# Patient Record
Sex: FEMALE | Race: WHITE | Employment: FULL TIME | URBAN - METROPOLITAN AREA
[De-identification: names, ages, dates, MRNs, and addresses within clinical notes are randomized per-mention and may not be internally consistent; named-entity substitution may affect disease eponyms.]

---

## 2021-01-12 ENCOUNTER — RECORDS - HEALTHEAST (OUTPATIENT)
Dept: LAB | Facility: CLINIC | Age: 31
End: 2021-01-12

## 2021-01-13 LAB — BACTERIA SPEC CULT: NO GROWTH

## 2021-04-30 ENCOUNTER — RECORDS - HEALTHEAST (OUTPATIENT)
Dept: LAB | Facility: CLINIC | Age: 31
End: 2021-04-30

## 2021-04-30 LAB
FASTING STATUS PATIENT QL REPORTED: ABNORMAL
GLUCOSE 1H P 50 G GLC PO SERPL-MCNC: 161 MG/DL (ref 70–139)
HGB BLD-MCNC: 12.7 G/DL (ref 12–16)

## 2021-07-19 PROCEDURE — 87210 SMEAR WET MOUNT SALINE/INK: CPT | Mod: ORL | Performed by: ADVANCED PRACTICE MIDWIFE

## 2021-07-19 PROCEDURE — 87086 URINE CULTURE/COLONY COUNT: CPT | Mod: ORL | Performed by: ADVANCED PRACTICE MIDWIFE

## 2021-07-20 ENCOUNTER — LAB REQUISITION (OUTPATIENT)
Dept: LAB | Facility: CLINIC | Age: 31
End: 2021-07-20
Payer: MEDICAID

## 2021-07-20 DIAGNOSIS — N89.9 NONINFLAMMATORY DISORDER OF VAGINA, UNSPECIFIED: ICD-10-CM

## 2021-07-20 DIAGNOSIS — R30.0 DYSURIA: ICD-10-CM

## 2021-07-20 LAB
CLUE CELLS: ABNORMAL
TRICHOMONAS, WET PREP: ABNORMAL
WBC'S/HIGH POWER FIELD, WET PREP: ABNORMAL
YEAST, WET PREP: ABNORMAL

## 2021-07-21 LAB — BACTERIA UR CULT: ABNORMAL

## 2021-08-23 ENCOUNTER — MEDICAL CORRESPONDENCE (OUTPATIENT)
Dept: HEALTH INFORMATION MANAGEMENT | Facility: CLINIC | Age: 31
End: 2021-08-23

## 2021-09-10 NOTE — PROGRESS NOTES
SUBJECTIVE:   CC: Caroline Braxton is an 30 year old woman who presents for preventive health visit.       Patient has been advised of split billing requirements and indicates understanding: Yes  Healthy Habits:     Getting at least 3 servings of Calcium per day:  Yes    Bi-annual eye exam:  NO    Dental care twice a year:  NO    Sleep apnea or symptoms of sleep apnea:  None    Diet:  Other    Frequency of exercise:  None    Taking medications regularly:  No    Medication side effects:  Not applicable    PHQ-2 Total Score: 0    Additional concerns today:  Yes (rash in groin area, csection scar )    - Rash, very itchy, seems to be spreading   - Tried all kinds of different creams - antibiotic, hydrocortisone, 2x jock itch spray, nothing really helped   - Patient is 3.5 months post partum       Today's PHQ-2 Score:   PHQ-2 ( 1999 [employer]) 10/4/2021   Q1: Little interest or pleasure in doing things 0   Q2: Feeling down, depressed or hopeless 0   PHQ-2 Score 0   Q1: Little interest or pleasure in doing things Not at all   Q2: Feeling down, depressed or hopeless Not at all   PHQ-2 Score 0       Abuse: Current or Past (Physical, Sexual or Emotional) - No  Do you feel safe in your environment? Yes    Have you ever done Advance Care Planning? (For example, a Health Directive, POLST, or a discussion with a medical provider or your loved ones about your wishes): No, advance care planning information given to patient to review.  Advanced care planning was discussed at today's visit.    Social History     Tobacco Use     Smoking status: Never Smoker     Smokeless tobacco: Never Used   Substance Use Topics     Alcohol use: Not Currently         Alcohol Use 10/4/2021   Prescreen: >3 drinks/day or >7 drinks/week? Not Applicable       Reviewed orders with patient.  Reviewed health maintenance and updated orders accordingly - Yes  Lab work is in process  Labs reviewed in EPIC  BP Readings from Last 3 Encounters:   10/04/21 110/62  "   Wt Readings from Last 3 Encounters:   10/04/21 65.8 kg (145 lb)                Breast Cancer Screening:  Any new diagnosis of family breast, ovarian, or bowel cancer? No    FHS-7: No flowsheet data found.    Patient under 40 years of age: Routine Mammogram Screening not recommended.   Pertinent mammograms are reviewed under the imaging tab.    History of abnormal Pap smear: NO - age 30-65 PAP every 5 years with negative HPV co-testing recommended     Reviewed and updated as needed this visit by clinical staff  Tobacco  Allergies  Meds  Problems  Med Hx  Surg Hx  Fam Hx  Soc Hx          Reviewed and updated as needed this visit by Provider    Meds  Problems  Med Hx  Surg Hx  Fam Hx         History reviewed. No pertinent past medical history.   History reviewed. No pertinent surgical history.    Review of Systems   Constitutional: Negative for chills and fever.   HENT: Negative for congestion, ear pain, hearing loss and sore throat.    Eyes: Negative for pain and visual disturbance.   Respiratory: Negative for cough and shortness of breath.    Cardiovascular: Negative for chest pain, palpitations and peripheral edema.   Gastrointestinal: Negative for abdominal pain, constipation, diarrhea, heartburn, hematochezia and nausea.   Breasts:  Negative for tenderness, breast mass and discharge.   Genitourinary: Negative for dysuria, frequency, genital sores, hematuria, pelvic pain, urgency, vaginal bleeding and vaginal discharge.   Musculoskeletal: Negative for arthralgias, joint swelling and myalgias.   Skin: Negative for rash.   Neurological: Negative for dizziness, weakness, headaches and paresthesias.   Psychiatric/Behavioral: Negative for mood changes. The patient is not nervous/anxious.           OBJECTIVE:   /62   Pulse 89   Temp 98  F (36.7  C) (Temporal)   Resp 18   Ht 1.508 m (4' 11.37\")   Wt 65.8 kg (145 lb)   SpO2 99%   Breastfeeding Yes   BMI 28.92 kg/m    Physical " Exam  Constitutional:       General: She is not in acute distress.     Appearance: She is well-developed.   HENT:      Right Ear: External ear normal.      Left Ear: External ear normal.      Nose: Nose normal.      Mouth/Throat:      Pharynx: No oropharyngeal exudate.   Eyes:      General:         Right eye: No discharge.         Left eye: No discharge.      Conjunctiva/sclera: Conjunctivae normal.      Pupils: Pupils are equal, round, and reactive to light.   Neck:      Thyroid: No thyromegaly.      Vascular: No JVD.      Trachea: No tracheal deviation.   Cardiovascular:      Rate and Rhythm: Normal rate and regular rhythm.      Heart sounds: Normal heart sounds. No murmur heard.   No friction rub. No gallop.    Pulmonary:      Effort: Pulmonary effort is normal. No respiratory distress.      Breath sounds: Normal breath sounds. No stridor. No wheezing or rales.   Chest:      Breasts: Breasts are symmetrical.         Right: No inverted nipple, mass, nipple discharge or skin change.         Left: No inverted nipple, mass, nipple discharge or skin change.   Abdominal:      General: Bowel sounds are normal. There is no distension.      Palpations: Abdomen is soft. There is no mass.      Tenderness: There is no abdominal tenderness. There is no guarding or rebound.      Hernia: No hernia is present.   Genitourinary:     Vagina: Normal. No vaginal discharge.      Cervix: No cervical motion tenderness or discharge.      Adnexa:         Right: No mass, tenderness or fullness.          Left: No mass, tenderness or fullness.         Musculoskeletal:         General: Normal range of motion.      Cervical back: Normal range of motion and neck supple.   Lymphadenopathy:      Cervical: No cervical adenopathy.   Skin:     General: Skin is warm and dry.   Neurological:      Mental Status: She is alert and oriented to person, place, and time.   Psychiatric:         Behavior: Behavior normal.         Thought Content: Thought  "content normal.         Judgment: Judgment normal.         Diagnostic Test Results: See orders pending in epic     ASSESSMENT/PLAN:       ICD-10-CM    1. Routine general medical examination at a health care facility  Z00.00    2. Screening for malignant neoplasm of cervix  Z12.4 Pap Screen with HPV - recommended age 30 - 65 years   3. Tinea corporis  B35.4 ketoconazole (NIZORAL) 2 % external cream       - Rash      Consistent with tinea/yeast infection      Discussed etiology, signs/symptoms, and treatment      Since breastfeeding, recommend topical treatment only      Discussed use and side effects of medication      Hand out given     - Patient declined other lab testing       Patient has been advised of split billing requirements and indicates understanding: Yes  COUNSELING:  Reviewed preventive health counseling, as reflected in patient instructions  Special attention given to:        Regular exercise       Healthy diet/nutrition       Immunizations    Declined: TDAP. Flu, COVID-19 due to Concerns about side effects/safety           Contraception       Family planning       Consider Hep C screening for all patients one time for ages 18-79 years       HIV screeninx in teen years, 1x in adult years, and at intervals if high risk       Advance Care Planning    Estimated body mass index is 28.92 kg/m  as calculated from the following:    Height as of this encounter: 1.508 m (4' 11.37\").    Weight as of this encounter: 65.8 kg (145 lb).    Weight management plan: Discussed healthy diet and exercise guidelines   - Patient is 3.5 months post-partum     She reports that she has never smoked. She has never used smokeless tobacco.      Counseling Resources:  ATP IV Guidelines  Pooled Cohorts Equation Calculator  Breast Cancer Risk Calculator  BRCA-Related Cancer Risk Assessment: FHS-7 Tool  FRAX Risk Assessment  ICSI Preventive Guidelines  Dietary Guidelines for Americans, 2010  USDA's MyPlate  ASA Prophylaxis  Lung " CA Screening    Review of the result(s) of each unique test - See list      Care Everywhere - 1/5/21 - HIV   Diagnosis or treatment significantly limited by social determinants of health - None     40 minutes spent on the date of the encounter doing chart review, history and exam, documentation and further activities as noted above    The patient indicates understanding of these issues and agrees with the plan.    Follow up: 1 year or PRN     Mirna Aguirre PA-C  [facility]

## 2021-09-10 NOTE — PATIENT INSTRUCTIONS
Preventive Health Recommendations  Female Ages 26 - 39  Yearly exam:   See your health care provider every year in order to    Review health changes.     Discuss preventive care.      Review your medicines if you your doctor has prescribed any.    Until age 30: Get a Pap test every three years (more often if you have had an abnormal result).    After age 30: Talk to your doctor about whether you should have a Pap test every 3 years or have a Pap test with HPV screening every 5 years.   You do not need a Pap test if your uterus was removed (hysterectomy) and you have not had cancer.  You should be tested each year for STDs (sexually transmitted diseases), if you're at risk.   Talk to your provider about how often to have your cholesterol checked.  If you are at risk for diabetes, you should have a diabetes test (fasting glucose).  Shots: Get a flu shot each year. Get a tetanus shot every 10 years.   Nutrition:     Eat at least 5 servings of fruits and vegetables each day.    Eat whole-grain bread, whole-wheat pasta and brown rice instead of white grains and rice.    Get adequate Calcium and Vitamin D.     Lifestyle    Exercise at least 150 minutes a week (30 minutes a day, 5 days of the week). This will help you control your weight and prevent disease.    Limit alcohol to one drink per day.    No smoking.     Wear sunscreen to prevent skin cancer.    See your dentist every six months for an exam and cleaning.    Patient Education     Jock Itch  Jock itch (tinea cruris) is a rash caused by a fungal infection. It occurs in the skin folds between the thigh and groin where it's warm and moist.   It starts as a small, red, itchy patch that grows larger in the shape of a round ring, 1 to 2 inches wide. It may cause the skin to flake. It may also spread to the scrotum or the skin that covers your testicles. This infection is treated with skin creams or oral medicine.   Home care  These tips will help you care for yourself  at home:     If you were prescribed a cream, apply it exactly as directed. Some antifungal creams are available without a prescription.    It may take a week before the fungus starts to go away and it can take about 2 to 3 weeks to completely clear. To prevent recurrence, it's important to keep using the medicine until the rash is all gone.    Wash the groin area at least once a day with soap and water. Pat dry and apply the medicine. Wear freshly laundered underwear daily.    Once the rash is gone, keep the area clean and dry to keep it from coming back. If recurrence is a problem, use a medicated antifungal powder daily in the groin area.  Prevention  These tips may help prevent jock itch:     Don't share clothes, towels, or sports gear with others unless they have been washed.    Change underwear daily.    Keep skin clean and dry, especially after showering or swimming.    If you are overweight, lose weight to prevent skin folds from rubbing against each other. .    Don't wear tight or non-cotton underwear.    Treat athlete s foot if it occurs.  Follow-up care  Follow up with your healthcare provider, or as advised if the rash is not starting to get better after 10 days of treatment or if the rash continues to spread.   When to seek medical care  Get prompt medical attention if any of the following occur:    Fluid drains from the rash    Redness around the rash increases.    Rash doesn't go away with treatment    Rash returns soon after treatment  Misael last reviewed this educational content on 8/1/2019 2000-2021 [employer]. All rights reserved. This information is not intended as a substitute for professional medical care. Always follow your healthcare professional's instructions.

## 2021-10-04 ENCOUNTER — OFFICE VISIT (OUTPATIENT)
Dept: FAMILY MEDICINE | Facility: OTHER | Age: 31
End: 2021-10-04
Payer: COMMERCIAL

## 2021-10-04 VITALS
TEMPERATURE: 98 F | BODY MASS INDEX: 29.23 KG/M2 | SYSTOLIC BLOOD PRESSURE: 110 MMHG | OXYGEN SATURATION: 99 % | HEIGHT: 59 IN | HEART RATE: 89 BPM | RESPIRATION RATE: 18 BRPM | DIASTOLIC BLOOD PRESSURE: 62 MMHG | WEIGHT: 145 LBS

## 2021-10-04 DIAGNOSIS — B35.4 TINEA CORPORIS: ICD-10-CM

## 2021-10-04 DIAGNOSIS — Z12.4 SCREENING FOR MALIGNANT NEOPLASM OF CERVIX: ICD-10-CM

## 2021-10-04 DIAGNOSIS — Z00.00 ROUTINE GENERAL MEDICAL EXAMINATION AT A HEALTH CARE FACILITY: Primary | ICD-10-CM

## 2021-10-04 PROCEDURE — 99395 PREV VISIT EST AGE 18-39: CPT | Performed by: PHYSICIAN ASSISTANT

## 2021-10-04 PROCEDURE — 99213 OFFICE O/P EST LOW 20 MIN: CPT | Mod: 25 | Performed by: PHYSICIAN ASSISTANT

## 2021-10-04 PROCEDURE — G0145 SCR C/V CYTO,THINLAYER,RESCR: HCPCS | Performed by: PHYSICIAN ASSISTANT

## 2021-10-04 PROCEDURE — 87624 HPV HI-RISK TYP POOLED RSLT: CPT | Performed by: PHYSICIAN ASSISTANT

## 2021-10-04 RX ORDER — KETOCONAZOLE 20 MG/G
CREAM TOPICAL 2 TIMES DAILY
Qty: 60 G | Refills: 1 | Status: SHIPPED | OUTPATIENT
Start: 2021-10-04

## 2021-10-04 ASSESSMENT — ENCOUNTER SYMPTOMS
BREAST MASS: 0
HEMATURIA: 0
FEVER: 0
HEMATOCHEZIA: 0
DIZZINESS: 0
SORE THROAT: 0
JOINT SWELLING: 0
CONSTIPATION: 0
DIARRHEA: 0
HEADACHES: 0
DYSURIA: 0
ARTHRALGIAS: 0
WEAKNESS: 0
PALPITATIONS: 0
MYALGIAS: 0
EYE PAIN: 0
COUGH: 0
FREQUENCY: 0
ABDOMINAL PAIN: 0
HEARTBURN: 0
SHORTNESS OF BREATH: 0
CHILLS: 0
NAUSEA: 0
PARESTHESIAS: 0
NERVOUS/ANXIOUS: 0

## 2021-10-04 ASSESSMENT — MIFFLIN-ST. JEOR: SCORE: 1284.22

## 2021-10-04 ASSESSMENT — PATIENT HEALTH QUESTIONNAIRE - PHQ9
SUM OF ALL RESPONSES TO PHQ QUESTIONS 1-9: 0
SUM OF ALL RESPONSES TO PHQ QUESTIONS 1-9: 0
10. IF YOU CHECKED OFF ANY PROBLEMS, HOW DIFFICULT HAVE THESE PROBLEMS MADE IT FOR YOU TO DO YOUR WORK, TAKE CARE OF THINGS AT HOME, OR GET ALONG WITH OTHER PEOPLE: NOT DIFFICULT AT ALL

## 2021-10-04 ASSESSMENT — PAIN SCALES - GENERAL: PAINLEVEL: NO PAIN (0)

## 2021-10-05 ASSESSMENT — PATIENT HEALTH QUESTIONNAIRE - PHQ9: SUM OF ALL RESPONSES TO PHQ QUESTIONS 1-9: 0

## 2021-10-06 LAB
BKR LAB AP GYN ADEQUACY: NORMAL
BKR LAB AP GYN INTERPRETATION: NORMAL
BKR LAB AP HPV REFLEX: NORMAL
BKR LAB AP PREVIOUS ABNORMAL: NORMAL
PATH REPORT.COMMENTS IMP SPEC: NORMAL
PATH REPORT.RELEVANT HX SPEC: NORMAL

## 2021-10-11 LAB
HUMAN PAPILLOMA VIRUS 16 DNA: NEGATIVE
HUMAN PAPILLOMA VIRUS 18 DNA: NEGATIVE
HUMAN PAPILLOMA VIRUS FINAL DIAGNOSIS: NORMAL
HUMAN PAPILLOMA VIRUS OTHER HR: NEGATIVE